# Patient Record
Sex: MALE | Race: BLACK OR AFRICAN AMERICAN | NOT HISPANIC OR LATINO | URBAN - METROPOLITAN AREA
[De-identification: names, ages, dates, MRNs, and addresses within clinical notes are randomized per-mention and may not be internally consistent; named-entity substitution may affect disease eponyms.]

---

## 2021-10-19 ENCOUNTER — EMERGENCY (EMERGENCY)
Facility: HOSPITAL | Age: 66
LOS: 1 days | Discharge: HOME | End: 2021-10-19
Attending: EMERGENCY MEDICINE
Payer: COMMERCIAL

## 2021-10-19 VITALS
HEART RATE: 62 BPM | SYSTOLIC BLOOD PRESSURE: 147 MMHG | TEMPERATURE: 98 F | DIASTOLIC BLOOD PRESSURE: 85 MMHG | OXYGEN SATURATION: 98 % | RESPIRATION RATE: 18 BRPM

## 2021-10-19 VITALS
DIASTOLIC BLOOD PRESSURE: 88 MMHG | SYSTOLIC BLOOD PRESSURE: 152 MMHG | OXYGEN SATURATION: 98 % | WEIGHT: 184.97 LBS | RESPIRATION RATE: 19 BRPM | TEMPERATURE: 98 F | HEART RATE: 65 BPM

## 2021-10-19 LAB
ALBUMIN SERPL ELPH-MCNC: 4.1 G/DL — SIGNIFICANT CHANGE UP (ref 3.3–5.2)
ALP SERPL-CCNC: 78 U/L — SIGNIFICANT CHANGE UP (ref 40–120)
ALT FLD-CCNC: 27 U/L — SIGNIFICANT CHANGE UP
ANION GAP SERPL CALC-SCNC: 10 MMOL/L — SIGNIFICANT CHANGE UP (ref 5–17)
APPEARANCE UR: CLEAR — SIGNIFICANT CHANGE UP
AST SERPL-CCNC: 21 U/L — SIGNIFICANT CHANGE UP
BASOPHILS # BLD AUTO: 0.03 K/UL — SIGNIFICANT CHANGE UP (ref 0–0.2)
BASOPHILS NFR BLD AUTO: 0.5 % — SIGNIFICANT CHANGE UP (ref 0–2)
BILIRUB SERPL-MCNC: 0.2 MG/DL — LOW (ref 0.4–2)
BILIRUB UR-MCNC: NEGATIVE — SIGNIFICANT CHANGE UP
BUN SERPL-MCNC: 22.1 MG/DL — HIGH (ref 8–20)
CALCIUM SERPL-MCNC: 8.9 MG/DL — SIGNIFICANT CHANGE UP (ref 8.6–10.2)
CHLORIDE SERPL-SCNC: 99 MMOL/L — SIGNIFICANT CHANGE UP (ref 98–107)
CO2 SERPL-SCNC: 27 MMOL/L — SIGNIFICANT CHANGE UP (ref 22–29)
COLOR SPEC: YELLOW — SIGNIFICANT CHANGE UP
CREAT SERPL-MCNC: 1.14 MG/DL — SIGNIFICANT CHANGE UP (ref 0.5–1.3)
DIFF PNL FLD: NEGATIVE — SIGNIFICANT CHANGE UP
EOSINOPHIL # BLD AUTO: 0.12 K/UL — SIGNIFICANT CHANGE UP (ref 0–0.5)
EOSINOPHIL NFR BLD AUTO: 2 % — SIGNIFICANT CHANGE UP (ref 0–6)
GLUCOSE BLDC GLUCOMTR-MCNC: 120 MG/DL — HIGH (ref 70–99)
GLUCOSE SERPL-MCNC: 143 MG/DL — HIGH (ref 70–99)
GLUCOSE UR QL: NEGATIVE MG/DL — SIGNIFICANT CHANGE UP
HCT VFR BLD CALC: 42.2 % — SIGNIFICANT CHANGE UP (ref 39–50)
HGB BLD-MCNC: 14.2 G/DL — SIGNIFICANT CHANGE UP (ref 13–17)
IMM GRANULOCYTES NFR BLD AUTO: 0.3 % — SIGNIFICANT CHANGE UP (ref 0–1.5)
KETONES UR-MCNC: NEGATIVE — SIGNIFICANT CHANGE UP
LEUKOCYTE ESTERASE UR-ACNC: NEGATIVE — SIGNIFICANT CHANGE UP
LIDOCAIN IGE QN: 158 U/L — HIGH (ref 22–51)
LYMPHOCYTES # BLD AUTO: 1.4 K/UL — SIGNIFICANT CHANGE UP (ref 1–3.3)
LYMPHOCYTES # BLD AUTO: 23.6 % — SIGNIFICANT CHANGE UP (ref 13–44)
MAGNESIUM SERPL-MCNC: 1.6 MG/DL — SIGNIFICANT CHANGE UP (ref 1.6–2.6)
MCHC RBC-ENTMCNC: 29.6 PG — SIGNIFICANT CHANGE UP (ref 27–34)
MCHC RBC-ENTMCNC: 33.6 GM/DL — SIGNIFICANT CHANGE UP (ref 32–36)
MCV RBC AUTO: 88.1 FL — SIGNIFICANT CHANGE UP (ref 80–100)
MONOCYTES # BLD AUTO: 0.41 K/UL — SIGNIFICANT CHANGE UP (ref 0–0.9)
MONOCYTES NFR BLD AUTO: 6.9 % — SIGNIFICANT CHANGE UP (ref 2–14)
NEUTROPHILS # BLD AUTO: 3.95 K/UL — SIGNIFICANT CHANGE UP (ref 1.8–7.4)
NEUTROPHILS NFR BLD AUTO: 66.7 % — SIGNIFICANT CHANGE UP (ref 43–77)
NITRITE UR-MCNC: NEGATIVE — SIGNIFICANT CHANGE UP
NT-PROBNP SERPL-SCNC: 6 PG/ML — SIGNIFICANT CHANGE UP (ref 0–300)
PH UR: 7 — SIGNIFICANT CHANGE UP (ref 5–8)
PLATELET # BLD AUTO: 187 K/UL — SIGNIFICANT CHANGE UP (ref 150–400)
POTASSIUM SERPL-MCNC: 3.7 MMOL/L — SIGNIFICANT CHANGE UP (ref 3.5–5.3)
POTASSIUM SERPL-SCNC: 3.7 MMOL/L — SIGNIFICANT CHANGE UP (ref 3.5–5.3)
PROT SERPL-MCNC: 7.1 G/DL — SIGNIFICANT CHANGE UP (ref 6.6–8.7)
PROT UR-MCNC: NEGATIVE MG/DL — SIGNIFICANT CHANGE UP
RAPID RVP RESULT: DETECTED
RBC # BLD: 4.79 M/UL — SIGNIFICANT CHANGE UP (ref 4.2–5.8)
RBC # FLD: 12.8 % — SIGNIFICANT CHANGE UP (ref 10.3–14.5)
RV+EV RNA SPEC QL NAA+PROBE: DETECTED
SARS-COV-2 RNA SPEC QL NAA+PROBE: SIGNIFICANT CHANGE UP
SODIUM SERPL-SCNC: 136 MMOL/L — SIGNIFICANT CHANGE UP (ref 135–145)
SP GR SPEC: 1 — LOW (ref 1.01–1.02)
TROPONIN T SERPL-MCNC: <0.01 NG/ML — SIGNIFICANT CHANGE UP (ref 0–0.06)
TSH SERPL-MCNC: 1.42 UIU/ML — SIGNIFICANT CHANGE UP (ref 0.27–4.2)
UROBILINOGEN FLD QL: NEGATIVE MG/DL — SIGNIFICANT CHANGE UP
WBC # BLD: 5.93 K/UL — SIGNIFICANT CHANGE UP (ref 3.8–10.5)
WBC # FLD AUTO: 5.93 K/UL — SIGNIFICANT CHANGE UP (ref 3.8–10.5)

## 2021-10-19 PROCEDURE — 71045 X-RAY EXAM CHEST 1 VIEW: CPT | Mod: 26

## 2021-10-19 PROCEDURE — 93306 TTE W/DOPPLER COMPLETE: CPT | Mod: 26

## 2021-10-19 PROCEDURE — 83880 ASSAY OF NATRIURETIC PEPTIDE: CPT

## 2021-10-19 PROCEDURE — 83690 ASSAY OF LIPASE: CPT

## 2021-10-19 PROCEDURE — 99284 EMERGENCY DEPT VISIT MOD MDM: CPT | Mod: 25

## 2021-10-19 PROCEDURE — 96361 HYDRATE IV INFUSION ADD-ON: CPT

## 2021-10-19 PROCEDURE — 82962 GLUCOSE BLOOD TEST: CPT

## 2021-10-19 PROCEDURE — 80053 COMPREHEN METABOLIC PANEL: CPT

## 2021-10-19 PROCEDURE — 0225U NFCT DS DNA&RNA 21 SARSCOV2: CPT

## 2021-10-19 PROCEDURE — 85025 COMPLETE CBC W/AUTO DIFF WBC: CPT

## 2021-10-19 PROCEDURE — 93010 ELECTROCARDIOGRAM REPORT: CPT | Mod: 76

## 2021-10-19 PROCEDURE — 84484 ASSAY OF TROPONIN QUANT: CPT

## 2021-10-19 PROCEDURE — G0378: CPT

## 2021-10-19 PROCEDURE — 81003 URINALYSIS AUTO W/O SCOPE: CPT

## 2021-10-19 PROCEDURE — 84443 ASSAY THYROID STIM HORMONE: CPT

## 2021-10-19 PROCEDURE — 36415 COLL VENOUS BLD VENIPUNCTURE: CPT

## 2021-10-19 PROCEDURE — 99234 HOSP IP/OBS SM DT SF/LOW 45: CPT

## 2021-10-19 PROCEDURE — 99284 EMERGENCY DEPT VISIT MOD MDM: CPT

## 2021-10-19 PROCEDURE — 87086 URINE CULTURE/COLONY COUNT: CPT

## 2021-10-19 PROCEDURE — 71045 X-RAY EXAM CHEST 1 VIEW: CPT

## 2021-10-19 PROCEDURE — 93005 ELECTROCARDIOGRAM TRACING: CPT

## 2021-10-19 PROCEDURE — 96374 THER/PROPH/DIAG INJ IV PUSH: CPT | Mod: XU

## 2021-10-19 PROCEDURE — 93306 TTE W/DOPPLER COMPLETE: CPT

## 2021-10-19 PROCEDURE — 83735 ASSAY OF MAGNESIUM: CPT

## 2021-10-19 RX ORDER — SODIUM CHLORIDE 9 MG/ML
500 INJECTION INTRAMUSCULAR; INTRAVENOUS; SUBCUTANEOUS ONCE
Refills: 0 | Status: COMPLETED | OUTPATIENT
Start: 2021-10-19 | End: 2021-10-19

## 2021-10-19 RX ORDER — DEXTROSE 50 % IN WATER 50 %
12.5 SYRINGE (ML) INTRAVENOUS ONCE
Refills: 0 | Status: DISCONTINUED | OUTPATIENT
Start: 2021-10-19 | End: 2021-10-23

## 2021-10-19 RX ORDER — SODIUM CHLORIDE 9 MG/ML
1000 INJECTION, SOLUTION INTRAVENOUS
Refills: 0 | Status: DISCONTINUED | OUTPATIENT
Start: 2021-10-19 | End: 2021-10-23

## 2021-10-19 RX ORDER — HYDROCHLOROTHIAZIDE 25 MG
12.5 TABLET ORAL AT BEDTIME
Refills: 0 | Status: DISCONTINUED | OUTPATIENT
Start: 2021-10-19 | End: 2021-10-23

## 2021-10-19 RX ORDER — FLUTICASONE PROPIONATE 50 MCG
1 SPRAY, SUSPENSION NASAL
Refills: 0 | Status: DISCONTINUED | OUTPATIENT
Start: 2021-10-19 | End: 2021-10-23

## 2021-10-19 RX ORDER — DEXTROSE 50 % IN WATER 50 %
15 SYRINGE (ML) INTRAVENOUS ONCE
Refills: 0 | Status: DISCONTINUED | OUTPATIENT
Start: 2021-10-19 | End: 2021-10-23

## 2021-10-19 RX ORDER — INSULIN LISPRO 100/ML
VIAL (ML) SUBCUTANEOUS
Refills: 0 | Status: DISCONTINUED | OUTPATIENT
Start: 2021-10-19 | End: 2021-10-23

## 2021-10-19 RX ORDER — DEXTROSE 50 % IN WATER 50 %
25 SYRINGE (ML) INTRAVENOUS ONCE
Refills: 0 | Status: DISCONTINUED | OUTPATIENT
Start: 2021-10-19 | End: 2021-10-23

## 2021-10-19 RX ORDER — ONDANSETRON 8 MG/1
4 TABLET, FILM COATED ORAL ONCE
Refills: 0 | Status: COMPLETED | OUTPATIENT
Start: 2021-10-19 | End: 2021-10-19

## 2021-10-19 RX ORDER — GLUCAGON INJECTION, SOLUTION 0.5 MG/.1ML
1 INJECTION, SOLUTION SUBCUTANEOUS ONCE
Refills: 0 | Status: DISCONTINUED | OUTPATIENT
Start: 2021-10-19 | End: 2021-10-23

## 2021-10-19 RX ORDER — LOSARTAN POTASSIUM 100 MG/1
100 TABLET, FILM COATED ORAL DAILY
Refills: 0 | Status: DISCONTINUED | OUTPATIENT
Start: 2021-10-19 | End: 2021-10-23

## 2021-10-19 RX ADMIN — SODIUM CHLORIDE 500 MILLILITER(S): 9 INJECTION INTRAMUSCULAR; INTRAVENOUS; SUBCUTANEOUS at 04:58

## 2021-10-19 RX ADMIN — LOSARTAN POTASSIUM 100 MILLIGRAM(S): 100 TABLET, FILM COATED ORAL at 06:10

## 2021-10-19 RX ADMIN — Medication 12.5 MILLIGRAM(S): at 06:10

## 2021-10-19 RX ADMIN — ONDANSETRON 4 MILLIGRAM(S): 8 TABLET, FILM COATED ORAL at 04:59

## 2021-10-19 RX ADMIN — SODIUM CHLORIDE 500 MILLILITER(S): 9 INJECTION INTRAMUSCULAR; INTRAVENOUS; SUBCUTANEOUS at 05:56

## 2021-10-19 NOTE — ED CDU PROVIDER INITIAL DAY NOTE - ATTENDING CONTRIBUTION TO CARE
65 yo well appearing male with brief episode of syncope when he woke up this morning. I personally saw the patient with the PA, and completed the key components of the history and physical exam. I then discussed the management plan with the PA.

## 2021-10-19 NOTE — ED CDU PROVIDER INITIAL DAY NOTE - OBJECTIVE STATEMENT
pt is a 65 y/o male with a pmhx of HTN, DM presenting to the ed for evaluation. pt states he got up to go to the bathroom tonight, started to feel dizzy, became diaphoretic so walked back to bed and passed out on the bed. pt states he then started vomiting multiple episodes non bloody non bilious. pt denies cardiac hx, follows up with cardiologist in [facility] last saw 2 years ago, believes stress/echo 2 years ago last, no abnormalities pt non smoker. pt denies headache neck pain head injury visual changes cp sob abd pain rectal bleeding melena diarrhea fever cough calf pain leg swelling hx of dvt or pe recent surgeries or travel

## 2021-10-19 NOTE — ED ADULT NURSE NOTE - OBJECTIVE STATEMENT
pt arrives w/ c/o dizziness and nausea. pt states he got up to use the bathroom and became so dizzy and nauseous. pt denies any chest pain, fever, cough, vomiting or falling. IV accessed 20 gauge LAC labs drawn and sent. pt placed on Cm normal sinus rhythm noted resp even and unlabored. waiting further orders will continue to monitor. pt arrives w/ c/o dizziness and nausea. pt states he got up to use the bathroom and became so dizzy and nauseous .pt states he went to bed and passed out and had two episodes of vomiting.  pt denies any chest pain, fever, cough, or falling. IV accessed 20 gauge LAC labs drawn and sent. pt placed on Cm normal sinus rhythm noted resp even and unlabored. waiting further orders will continue to monitor.

## 2021-10-19 NOTE — ED CDU PROVIDER DISPOSITION NOTE - CLINICAL COURSE
66 year old male with PMHx HTN, DM presents to the ED for episode of syncope after walking away from bathroom. No h/o cardiac dz. Pt placed in obs and seen by Cardiology. Pt with 3 negative trops, urine neg, echo with nl EF, no WMA. RVP + for enterovirus. Pt feeling well at this time. Will dc with return precautions copy of results and advise pt to follow up with his established cardiologist.

## 2021-10-19 NOTE — CONSULT NOTE ADULT - SUBJECTIVE AND OBJECTIVE BOX
[place] [facility]                                                               Office:  [address] [address]                                                              Telephone: [phone]. Fax:[phone]                                                                        CARDIOLOGY CONSULTATION NOTE                                                                                             Reason for Consultation: Dizzy, syncope  History obtained by: Patient and medical record   obtained: No  Cardiologist: diamante    Chief complaint:    Patient is a 66y old  Male who presents with a chief complaint of dizzy      HPI: 66 M with PMH HTN, DM, presenting to ED with c/o dizzy. Pt states got up in night to go to the bathroom, felt dizzy, did not go to bathroom, went back to bed, passed out. Wife stated didn't not answer right away, diaphoretic, Nausea-vomiting x 3. Follows with [facility]. In ED RVP + enterovirus. c/o cough, denies fevers, runny nose.         REVIEW OF SYMPTOMS:     CONSTITUTIONAL: No fever, no weight loss, no fatigue, no weight gain   ENMT:  No difficulty hearing, tinnitus, vertigo; No sinus or throat pain  NECK: No pain or stiffness  CARDIOVASCULAR: No chest pain, no dyspnea, no syncope, no palpitations, no dizziness, no Orthopnea, no Paroxsymal nocturnal dyspnea  RESPIRATORY: No Dyspnea on exertion, no Shortness of breath, no cough, no wheezing  : No dysuria, no hematuria   GI: No dark color stool, no melena, no diarrhea, no constipation, no abdominal pain   NEURO: No headache, no dizziness, no slurred speech   MUSCULOSKELETAL: No joint pain or swelling; No muscle, back, or extremity pain  PSYCH: No agitation, no anxiety.    ALL OTHER REVIEW OF SYSTEMS ARE NEGATIVE.      ALLERGIES: Allergies  No Known Allergies  Intolerances        PAST MEDICAL HISTORY  HTN  DM    PAST SURGICAL HISTORY      FAMILY HISTORY:      SOCIAL HISTORY:  Denies smoking/alcohol/drugs      CURRENT MEDICATIONS:  hydrochlorothiazide 12.5 milliGRAM(s) Oral at bedtime  losartan 100 milliGRAM(s) Oral daily    dextrose 40% Gel  dextrose 5%.  dextrose 50% Injectable  fluticasone propionate 50 MICROgram(s)/spray Nasal Spray  glucagon  Injectable  insulin lispro (ADMELOG) corrective regimen sliding scale      HOME MEDICATIONS:      Vital Signs Last 24 Hrs  T(C): 36.8 (19 Oct 2021 05:03), Max: 36.8 (19 Oct 2021 05:03)  T(F): 98.2 (19 Oct 2021 05:03), Max: 98.2 (19 Oct 2021 05:03)  HR: 62 (19 Oct 2021 05:03) (62 - 65)  BP: 147/85 (19 Oct 2021 05:03) (147/85 - 152/88)  RR: 18 (19 Oct 2021 05:03) (18 - 19)  SpO2: 98% (19 Oct 2021 05:03) (98% - 98%)        PHYSICAL EXAM:  Constitutional: Comfortable . No acute distress.   HEENT: Atraumatic and normocephalic, neck is supple. + JVD.   CNS: A&Ox3. No focal deficits. EOMI.   Respiratory: CTAB, unlabored   Cardiovascular: S1S2 RRR. No murmur/rubs or gallop.  Gastrointestinal: Soft non-tender and non distended . +Bowel sounds.   Extremities: trace edema.   Psychiatric: Calm. no agitation.  Skin: No skin rash/ulcers visualized to face, hands or feet.        LABS:                        14.2   5.93  )-----------( 187      ( 19 Oct 2021 05:09 )             42.2     10-19    136  |  99  |  22.1<H>  ----------------------------<  143<H>  3.7   |  27.0  |  1.14    Ca    8.9      19 Oct 2021 05:09  Mg     1.6     10-19    TPro  7.1  /  Alb  4.1  /  TBili  0.2<L>  /  DBili  x   /  AST  21  /  ALT  27  /  AlkPhos  78  10-19    CARDIAC MARKERS ( 19 Oct 2021 08:35 )  x     / <0.01 ng/mL / x     / x     / x      CARDIAC MARKERS ( 19 Oct 2021 05:09 )  x     / <0.01 ng/mL / x     / x     / x        ;p-BNP=Serum Pro-Brain Natriuretic Peptide: 6 pg/mL (10-19 @ 05:09)    Urinalysis Basic - ( 19 Oct 2021 08:52 )    Color: Yellow / Appearance: Clear / S.005 / pH: x  Gluc: x / Ketone: Negative  / Bili: Negative / Urobili: Negative mg/dL   Blood: x / Protein: Negative mg/dL / Nitrite: Negative   Leuk Esterase: Negative / RBC: x / WBC x   Sq Epi: x / Non Sq Epi: x / Bacteria: x      INTERPRETATION OF TELEMETRY: Reviewed by me.   ECG: SR NSST T abnl    RADIOLOGY & ADDITIONAL STUDIES:    X-ray:  reviewed by me.

## 2021-10-19 NOTE — CONSULT NOTE ADULT - ASSESSMENT
66 M with PMH HTN, DM, presenting to ED with c/o dizzy. Pt states got up in night to go to the bathroom, felt dizzy, did not go to bathroom, went back to bed, passed out. Wife stated didn't not answer right away, diaphoretic, Nausea-vomiting x 3. Follows with [facility]. In ED RVP + enterovirus.    Syncope   - Orthostatics neg  - +enterovirus   - EKG SR, NSST abnl  - telemetry SB 56  - TTE pending  - follows with [facility]  - recommend [facility] as out patient, outpatient ischemic work up  - if TTE normal EF, normal valvular function, no RWMA can follow up with pvt cardiologist

## 2021-10-19 NOTE — ED ADULT NURSE NOTE - NS TRANSFER PATIENT BELONGINGS
What can I do to prevent getting a rash under the breast?  There are some simple things you can do to reduce your risk of getting intertrigo and stop any irritation from getting worse.  1. Wash under your breasts morning and night with a gentle soap or soap substitute (for example emulsifying ointment). You can ask your pharmacist about this.  2. Dry the skin under your breasts thoroughly after washing - gently pat dry with a clean, soft towel or you can try using a hairdryer on a cool setting. This can be very effective, especially if you have large breasts.  3. Wear a well-fitting, supportive bra made from a natural material such as cotton. Manmade materials such as nylon can trap moisture. It can help to wear a cotton top under your bra. If the skin is weeping then try to change your bra daily or use cotton dressings. You can ask your practice nurse for help with this.  4. Losing weight may help to limit the areas where skin can rub against skin.  - Use DESITIN diaper cream to dry out area if possible   Clothing

## 2021-10-19 NOTE — ED PROVIDER NOTE - ATTENDING CONTRIBUTION TO CARE
67 yo well appearing male with brief episode of syncope when he woke up this morning. I personally saw the patient with the PA, and completed the key components of the history and physical exam. I then discussed the management plan with the PA.

## 2021-10-19 NOTE — ED CDU PROVIDER DISPOSITION NOTE - PATIENT PORTAL LINK FT
You can access the FollowMyHealth Patient Portal offered by [facility] by registering at the following website: [email or web address]. By joining [employer]’s FollowMyHealth portal, you will also be able to view your health information using other applications (apps) compatible with our system.

## 2021-10-19 NOTE — ED ADULT NURSE NOTE - CHIEF COMPLAINT QUOTE
Patient with complains of syncopal episode with diaphoresis, nausea vomit and sbp in 70's after going to bathroom. Patient denies any neuro and motor defect. Patient alert and oriented times four with gcs 15. sbp in 150's in triage. . Patient ambulatory upon arrival.

## 2021-10-19 NOTE — ED PROVIDER NOTE - OBJECTIVE STATEMENT
pt is a 65 y/o male with a pmhx of HTN, DM presenting to the ed for evaluation. pt states he got up to go to the bathroom tonight, started to feel dizzy, became diaphoretic so walked back to bed and passed out on the bed. pt states he then started vomiting multiple episodes non bloody non bilious. pt denies cardiac hx, follows up with cardiologist in [facility] last saw 2 years ago, believes stress/echo 2 years ago last, no abnormalities pt non smoker. pt denies headache neck pain head injury visual changes cp sob abd pain rectal bleeding melena diarrhea fever cough calf pain leg swelling hx of dvt or pe recent surgeries or travel

## 2021-10-19 NOTE — ED ADULT TRIAGE NOTE - CHIEF COMPLAINT QUOTE
Patient with complains of syncopal episode with diaphoresis, nausea vomit and sbp in 70's after going to bathroom. Patient denies any neuro and motor defect. Patient alert and oriented times four with gcs 15. Patient with complains of syncopal episode with diaphoresis, nausea vomit and sbp in 70's after going to bathroom. Patient denies any neuro and motor defect. Patient alert and oriented times four with gcs 15. sbp in 150's in triage. Patient with complains of syncopal episode with diaphoresis, nausea vomit and sbp in 70's after going to bathroom. Patient denies any neuro and motor defect. Patient alert and oriented times four with gcs 15. sbp in 150's in triage. . Patient ambulatory in walk in triage, Patient with complains of syncopal episode with diaphoresis, nausea vomit and sbp in 70's after going to bathroom. Patient denies any neuro and motor defect. Patient alert and oriented times four with gcs 15. sbp in 150's in triage. . Patient ambulatory upon arrival.

## 2021-10-19 NOTE — CONSULT NOTE ADULT - ATTENDING COMMENTS
Patient seen and examined by me.  Patient is a 66 M with PMH HTN, DM, presenting to ED with c/o dizzy. Pt states got up in night to go to the bathroom, felt dizzy, did not go to bathroom, went back to bed, passed out. Wife stated didn't not answer right away, diaphoretic, Nausea-vomiting x 3. Follows with [facility]. In ED RVP + enterovirus.    < from: TTE Echo Complete w/o Contrast w/ Doppler (10.19.21 @ 11:21) >      Summary:   1. Left ventricular ejection fraction, by visual estimation, is 60 to 65%.   2. Normal global left ventricular systolic function.   3. Spectral Doppler shows impaired relaxation pattern of left ventricular myocardial filling (Grade I diastolic dysfunction).   4. Normal left atrial size.   5. Normal right atrial size.   6. Mild mitral valve regurgitation.   7. Mild thickening of the anterior mitral valve leaflet.   8. Sclerotic aortic valve with normal opening.   9. There is no evidence of pericardial effusion.  10. There are no prior studies on this patient for comparison purposes.    Rosmery Nina D.O. Electronically signed on 10/19/2021 at 12:29:34 PM    < end of copied text >      Exam- Unremarkable.  Orthostatics negative    Patients EKG- NSR. Normal  ECHO is unremarkable.  Troponins are negative.  Patient and his wife reported that he has an appointment to f/u with his Cardiologist at [facility] on Friday.    Patient may be discharged home     Will sign off.

## 2021-10-19 NOTE — ED PROVIDER NOTE - ENMT NEGATIVE STATEMENT, MLM
Ears: no ear pain and no hearing problems. Nose: no nasal congestion and no nasal drainage. Mouth/Throat: no dysphagia, no hoarseness and no throat pain. Neck: no lumps, no pain, no stiffness and no swollen glands. barbara sister

## 2021-10-19 NOTE — ED CDU PROVIDER DISPOSITION NOTE - NSFOLLOWUPINSTRUCTIONS_ED_ALL_ED_FT
Follow up with your Cardiologist within 1-2 weeks  Follow up with primary medical doctor in 2-3 days   Drink lots of fluids   Copy of results provided to you       Syncope    WHAT YOU NEED TO KNOW:    Syncope is also called fainting or passing out. Syncope is a sudden, temporary loss of consciousness, followed by a fall from a standing or sitting position. Syncope ranges from not serious to a sign of a more serious condition that needs to be treated. You can control some health conditions that cause syncope. Your healthcare providers can help you create a plan to manage syncope and prevent episodes.    DISCHARGE INSTRUCTIONS:    Seek care immediately if:   •You are bleeding because you hit your head when you fainted.       •You suddenly have double vision, difficulty speaking, numbness, and cannot move your arms or legs.      •You have chest pain and trouble breathing.      •You vomit blood or material that looks like coffee grounds.      •You see blood in your bowel movement.      Contact your healthcare provider if:   •You have new or worsening symptoms.      •You have another syncope episode.      •You have a headache, fast heartbeat, or feel too dizzy to stand up.      •You have questions or concerns about your condition or care.      Medicines:   •Medicines may be needed to help your heart pump strongly and regularly. Your healthcare provider may also make changes to any medicines that are causing syncope.       •Take your medicine as directed. Contact your healthcare provider if you think your medicine is not helping or if you have side effects. Tell him or her if you are allergic to any medicine. Keep a list of the medicines, vitamins, and herbs you take. Include the amounts, and when and why you take them. Bring the list or the pill bottles to follow-up visits. Carry your medicine list with you in case of an emergency.      Follow up with your doctor as directed: Write down your questions so you remember to ask them during your visits.     Manage syncope:   •Keep a record of your syncope episodes. Include your symptoms and your activity before and after the episode. The record can help your healthcare provider find the cause of your syncope and help you manage episodes.      •Sit or lie down when needed. This includes when you feel dizzy, your throat is getting tight, and your vision changes. Raise your legs above the level of your heart.      •Take slow, deep breaths if you start to breathe faster with anxiety or fear. This can help decrease dizziness and the feeling that you might faint.       •Check your blood pressure often. This is important if you take medicine to lower your blood pressure. Check your blood pressure when you are lying down and when you are standing. Ask how often to check during the day. Keep a record of your blood pressure numbers. Your healthcare provider may use the record to help plan your treatment.  How to take a Blood Pressure           Prevent a syncope episode:   •Move slowly and let yourself get used to one position before you move to another position. This is very important when you change from a lying or sitting position to a standing position. Take some deep breaths before you stand up from a lying position. Stand up slowly. Sudden movements may cause a fainting spell. Sit on the side of the bed or couch for a few minutes before you stand up. If you are on bedrest, try to be upright for about 2 hours each day, or as directed. Do not lock your legs if you are standing for a long period of time. Move your legs and bend your knees to keep blood flowing.      •Follow your healthcare provider's recommendations. Your provider may recommend that you drink more liquids to prevent dehydration. You may also need to have more salt to keep your blood pressure from dropping too low and causing syncope. Your provider will tell you how much liquid and sodium to have each day. He or she will also tell you how much physical activity is safe for you. This will depend on what is causing your syncope.      •Watch for signs of low blood sugar. These include hunger, nervousness, sweating, and fast or fluttery heartbeats. Talk with your healthcare provider about ways to keep your blood sugar level steady.      •Do not strain if you are constipated. You may faint if you strain to have a bowel movement. Walking is the best way to get your bowels moving. Eat foods high in fiber to make it easier to have a bowel movement. Good examples are high-fiber cereals, beans, vegetables, and whole-grain breads. Prune juice may help make bowel movements softer.      •Be careful in hot weather. Heat can cause a syncope episode. Limit activity done outside on hot days. Physical activity in hot weather can lead to dehydration. This can cause an episode.

## 2021-10-20 LAB
CULTURE RESULTS: SIGNIFICANT CHANGE UP
SPECIMEN SOURCE: SIGNIFICANT CHANGE UP

## 2024-01-03 NOTE — ED CDU PROVIDER DISPOSITION NOTE - NSFOLLOWUPCLINICS_GEN_ALL_ED_FT
[facility]  Cardiology  [address] [place] [address]  Phone: [phone]  Fax:     
no

## 2024-05-20 ENCOUNTER — EMERGENCY (EMERGENCY)
Facility: HOSPITAL | Age: 69
LOS: 1 days | Discharge: HOME | End: 2024-05-20
Attending: STUDENT IN AN ORGANIZED HEALTH CARE EDUCATION/TRAINING PROGRAM
Payer: MEDICARE

## 2024-05-20 VITALS
OXYGEN SATURATION: 95 % | SYSTOLIC BLOOD PRESSURE: 123 MMHG | DIASTOLIC BLOOD PRESSURE: 78 MMHG | TEMPERATURE: 97 F | HEART RATE: 62 BPM | RESPIRATION RATE: 16 BRPM

## 2024-05-20 LAB
ALBUMIN SERPL ELPH-MCNC: 4 G/DL — SIGNIFICANT CHANGE UP (ref 3.3–5.2)
ALP SERPL-CCNC: 107 U/L — SIGNIFICANT CHANGE UP (ref 40–120)
ALT FLD-CCNC: 73 U/L — HIGH
ANION GAP SERPL CALC-SCNC: 14 MMOL/L — SIGNIFICANT CHANGE UP (ref 5–17)
AST SERPL-CCNC: 61 U/L — HIGH
BASOPHILS # BLD AUTO: 0.01 K/UL — SIGNIFICANT CHANGE UP (ref 0–0.2)
BASOPHILS NFR BLD AUTO: 0.2 % — SIGNIFICANT CHANGE UP (ref 0–2)
BILIRUB SERPL-MCNC: 0.2 MG/DL — LOW (ref 0.4–2)
BUN SERPL-MCNC: 17.3 MG/DL — SIGNIFICANT CHANGE UP (ref 8–20)
CALCIUM SERPL-MCNC: 9.6 MG/DL — SIGNIFICANT CHANGE UP (ref 8.4–10.5)
CHLORIDE SERPL-SCNC: 97 MMOL/L — SIGNIFICANT CHANGE UP (ref 96–108)
CO2 SERPL-SCNC: 25 MMOL/L — SIGNIFICANT CHANGE UP (ref 22–29)
CREAT SERPL-MCNC: 0.94 MG/DL — SIGNIFICANT CHANGE UP (ref 0.5–1.3)
D DIMER BLD IA.RAPID-MCNC: 217 NG/ML DDU — SIGNIFICANT CHANGE UP
EGFR: 89 ML/MIN/1.73M2 — SIGNIFICANT CHANGE UP
EOSINOPHIL # BLD AUTO: 0.05 K/UL — SIGNIFICANT CHANGE UP (ref 0–0.5)
EOSINOPHIL NFR BLD AUTO: 0.9 % — SIGNIFICANT CHANGE UP (ref 0–6)
GLUCOSE SERPL-MCNC: 214 MG/DL — HIGH (ref 70–99)
HCT VFR BLD CALC: 38.1 % — LOW (ref 39–50)
HGB BLD-MCNC: 13 G/DL — SIGNIFICANT CHANGE UP (ref 13–17)
IMM GRANULOCYTES NFR BLD AUTO: 0.4 % — SIGNIFICANT CHANGE UP (ref 0–0.9)
LYMPHOCYTES # BLD AUTO: 0.53 K/UL — LOW (ref 1–3.3)
LYMPHOCYTES # BLD AUTO: 9.8 % — LOW (ref 13–44)
MAGNESIUM SERPL-MCNC: 1.5 MG/DL — LOW (ref 1.6–2.6)
MCHC RBC-ENTMCNC: 29 PG — SIGNIFICANT CHANGE UP (ref 27–34)
MCHC RBC-ENTMCNC: 34.1 GM/DL — SIGNIFICANT CHANGE UP (ref 32–36)
MCV RBC AUTO: 84.9 FL — SIGNIFICANT CHANGE UP (ref 80–100)
MONOCYTES # BLD AUTO: 0.31 K/UL — SIGNIFICANT CHANGE UP (ref 0–0.9)
MONOCYTES NFR BLD AUTO: 5.7 % — SIGNIFICANT CHANGE UP (ref 2–14)
NEUTROPHILS # BLD AUTO: 4.49 K/UL — SIGNIFICANT CHANGE UP (ref 1.8–7.4)
NEUTROPHILS NFR BLD AUTO: 83 % — HIGH (ref 43–77)
PHOSPHATE SERPL-MCNC: 4 MG/DL — SIGNIFICANT CHANGE UP (ref 2.4–4.7)
PLATELET # BLD AUTO: 154 K/UL — SIGNIFICANT CHANGE UP (ref 150–400)
POTASSIUM SERPL-MCNC: 5.3 MMOL/L — SIGNIFICANT CHANGE UP (ref 3.5–5.3)
POTASSIUM SERPL-SCNC: 5.3 MMOL/L — SIGNIFICANT CHANGE UP (ref 3.5–5.3)
PROT SERPL-MCNC: 7.3 G/DL — SIGNIFICANT CHANGE UP (ref 6.6–8.7)
RBC # BLD: 4.49 M/UL — SIGNIFICANT CHANGE UP (ref 4.2–5.8)
RBC # FLD: 12.4 % — SIGNIFICANT CHANGE UP (ref 10.3–14.5)
SODIUM SERPL-SCNC: 136 MMOL/L — SIGNIFICANT CHANGE UP (ref 135–145)
TROPONIN T, HIGH SENSITIVITY RESULT: 37 NG/L — SIGNIFICANT CHANGE UP (ref 0–51)
TROPONIN T, HIGH SENSITIVITY RESULT: 41 NG/L — SIGNIFICANT CHANGE UP (ref 0–51)
TSH SERPL-MCNC: 1.16 UIU/ML — SIGNIFICANT CHANGE UP (ref 0.27–4.2)
WBC # BLD: 5.41 K/UL — SIGNIFICANT CHANGE UP (ref 3.8–10.5)
WBC # FLD AUTO: 5.41 K/UL — SIGNIFICANT CHANGE UP (ref 3.8–10.5)

## 2024-05-20 PROCEDURE — 71045 X-RAY EXAM CHEST 1 VIEW: CPT | Mod: 26

## 2024-05-20 PROCEDURE — 93010 ELECTROCARDIOGRAM REPORT: CPT

## 2024-05-20 PROCEDURE — 99285 EMERGENCY DEPT VISIT HI MDM: CPT

## 2024-05-20 RX ORDER — MAGNESIUM SULFATE 500 MG/ML
2 VIAL (ML) INJECTION ONCE
Refills: 0 | Status: COMPLETED | OUTPATIENT
Start: 2024-05-20 | End: 2024-05-20

## 2024-05-20 RX ORDER — MAGNESIUM OXIDE 400 MG ORAL TABLET 241.3 MG
400 TABLET ORAL ONCE
Refills: 0 | Status: COMPLETED | OUTPATIENT
Start: 2024-05-20 | End: 2024-05-20

## 2024-05-20 RX ORDER — METHYLPREDNISOLONE 4 MG
500 TABLET ORAL ONCE
Refills: 0 | Status: DISCONTINUED | OUTPATIENT
Start: 2024-05-20 | End: 2024-05-20

## 2024-05-20 RX ADMIN — MAGNESIUM OXIDE 400 MG ORAL TABLET 400 MILLIGRAM(S): 241.3 TABLET ORAL at 22:27

## 2024-05-20 RX ADMIN — Medication 25 GRAM(S): at 22:27

## 2024-05-20 NOTE — ED ADULT NURSE NOTE - OBJECTIVE STATEMENT
Patient presents with c/p syncopal episode at home with head strike. PMHx prostate ca on radiation.  Pt A&Ox4, denies sob/chest pain, resting on stretcher in no apparent distress at this time.  Respirations even and unlabored, negative JVD/diaphoresis.

## 2024-05-20 NOTE — ED ADULT TRIAGE NOTE - GLASGOW COMA SCALE: BEST VERBAL RESPONSE, MLM
Srinivas Mackenzie was in the clinic today to see Tam Donahue MD for   Chief Complaint   Patient presents with   • Blood Pressure   .    Please note, his blood pressures were elevated x2 while in the clinic.    BP Readings from Last 1 Encounters:   01/11/21 1246 (!) 142/82   01/11/21 1231 (!) 140/92       Please review with PCP and follow up with patient as appropriate.     (V5) oriented

## 2024-05-20 NOTE — ED ADULT TRIAGE NOTE - CHIEF COMPLAINT QUOTE
c/o syncopal episode at home.  -head strike, witnessed by wife. Pt with hx of prostate CA, had chemo today. denies CP.

## 2024-05-20 NOTE — ED PROVIDER NOTE - NSTIMEPROVIDERCAREINITIATE_GEN_ER
Bill 45  Progress Note Henry Desai 1957, 60 y o  female MRN: [id number]  Unit/Bed#: [address] [place] [address] Encounter: [id number]  Primary Care Provider: Hipolito Blanton MD   Date and time admitted to hospital: 8/14/2021  1:01 AM    * Acute respiratory failure with hypoxia due to COVID 19 pneumonia  Assessment & Plan  Initially hypoxic, tachypneic  COVID positive  Symptom onset 8/8  Significant other was COVID (+) 2 weeks prior  On imaging, noted to have findings consistent with COVID pneumonia  CRP initially elevated but has normalized  Procalcitonin negative  D-dimer less than 0 27  Continue statin  Completed course of Decadron 0 1 mg/kg IV Q 12 hrs today  Not a candidate for convalescent plasma  Completed remdesivir  Continue therapeutic Lovenox  Currently on 7 liters mid flow  20 milligrams of IV Lasix x1 given 8/27    Atrial fibrillation [facility])  Assessment & Plan  New onset AFib  Continue Lopressor  On Therapeutic Lovenox  Echo results pending    Type 2 diabetes mellitus without complication, without long-term current use of insulin [facility])  Assessment & Plan  Lab Results   Component Value Date    HGBA1C 8 0 (H) 08/28/2021       Recent Labs     08/27/21  1658 08/27/21  2108 08/28/21  0732 08/28/21  1053   POCGLU 320* 281* 236* 315*     Patient had persistent hyperglycemia while on steroids and was started on insulin drip which was discontinued yesterday sugars were stable  Continue Patient on Accu-Cheks with sliding scale insulin    BMI 40 0-44 9, adult (HCC)  Assessment & Plan  Body mass index is 43 35 kg/m²  Pain in both feet  Assessment & Plan  Continue tramadol p r n    Gastroesophageal reflux disease without esophagitis  Assessment & Plan  On Protonix here  Mixed hyperlipidemia  Assessment & Plan  Continue statin  Essential hypertension  Assessment & Plan  Continue Lopressor        VTE Pharmacologic Prophylaxis: VTE Score: 4 Moderate Risk (Score 3-4) - Pharmacological DVT Prophylaxis Ordered: enoxaparin (Lovenox)  Patient Centered Rounds: I performed bedside rounds with nursing staff today  Discussions with Specialists or Other Care Team Provider: yes    Education and Discussions with Family / Patient: Patient declined call to   Time Spent for Care: 30 minutes  More than 50% of total time spent on counseling and coordination of care as described above  Current Length of Stay: 14 day(s)  Current Patient Status: Inpatient   Certification Statement: The patient will continue to require additional inpatient hospital stay due to Acute respiratory failure with hypoxia  Discharge Plan: home    Code Status: Level 1 - Full Code    Subjective:   Continues to report improvement in breathing compared to before  Still with exertional shortness of breath    Objective:     Vitals:   Temp (24hrs), Av 7 °F (36 5 °C), Min:96 4 °F (35 8 °C), Max:98 8 °F (37 1 °C)    Temp:  [96 4 °F (35 8 °C)-98 8 °F (37 1 °C)] 97 9 °F (36 6 °C)  HR:  [68-85] 85  Resp:  [18-22] 22  BP: (107-127)/(64-70) 111/68  SpO2:  [90 %-96 %] 94 %  Body mass index is 43 35 kg/m²  Input and Output Summary (last 24 hours): Intake/Output Summary (Last 24 hours) at 2021 1637  Last data filed at 2021 0630  Gross per 24 hour   Intake 630 ml   Output 1100 ml   Net -470 ml       Physical Exam:   Physical Exam  Vitals reviewed  Constitutional:       General: She is not in acute distress  Appearance: She is well-developed  HENT:      Head: Normocephalic and atraumatic  Eyes:      Conjunctiva/sclera: Conjunctivae normal    Cardiovascular:      Rate and Rhythm: Normal rate and regular rhythm  Pulmonary:      Effort: Pulmonary effort is normal  No respiratory distress  Breath sounds: Normal breath sounds  No wheezing or rales  Abdominal:      General: There is no distension  Palpations: Abdomen is soft  Tenderness: There is no abdominal tenderness  Neurological:      Mental Status: She is alert and oriented to person, place, and time            Additional Data:     Labs:  Results from last 7 days   Lab Units 08/26/21  0623 08/24/21  0616   WBC Thousand/uL 12 32* 11 64*   HEMOGLOBIN g/dL 13 7 11 6   HEMATOCRIT % 40 7 34 6*   PLATELETS Thousands/uL 299 341   BANDS PCT %  --  1   NEUTROS PCT % 87*  --    LYMPHS PCT % 6*  --    LYMPHO PCT %  --  9*   MONOS PCT % 5  --    MONO PCT %  --  2*   EOS PCT % 0 0     Results from last 7 days   Lab Units 08/28/21  0652   SODIUM mmol/L 135*   POTASSIUM mmol/L 4 5   CHLORIDE mmol/L 103   CO2 mmol/L 25   BUN mg/dL 36*   CREATININE mg/dL 1 15   ANION GAP mmol/L 7   CALCIUM mg/dL 7 6*   GLUCOSE RANDOM mg/dL 241*         Results from last 7 days   Lab Units 08/28/21  1053 08/28/21  0732 08/27/21  2108 08/27/21  1658 08/27/21  1125 08/27/21  0728 08/27/21  0557 08/27/21  0345 08/27/21  0150 08/27/21  0005 08/26/21  2216 08/26/21  2020   POC GLUCOSE mg/dl 315* 236* 281* 320* 223* 97 116 143* 108 110 112 152*     Results from last 7 days   Lab Units 08/28/21  0652   HEMOGLOBIN A1C % 8 0*           Lines/Drains:  Invasive Devices     Peripheral Intravenous Line            Long-Dwell Peripheral IV (Midline) 67/61/86 Right Basilic 6 days    Peripheral IV 08/23/21 Left Antecubital 5 days          Drain            External Urinary Catheter 1 day                  Telemetry:  Telemetry Orders (From admission, onward)             48 Hour Telemetry Monitoring  Continuous x 48 hours     Question:  Reason for 48 Hour Telemetry  Answer:  Arrhythmias Requiring Medical Therapy (eg  SVT, Vtach/fib, Bradycardia, Uncontrolled A-fib)                          Imaging: Reviewed radiology reports from this admission including: chest xray    Recent Cultures (last 7 days):         Last 24 Hours Medication List:   Current Facility-Administered Medications   Medication Dose Route Frequency Provider Last Rate    atorvastatin  40 mg Oral MILLI Scott MD Antoinette      benzonatate  100 mg Oral TID PRN Pita Matute MD      enoxaparin  1 mg/kg Subcutaneous Q12H [facility] Pita Matute MD      insulin lispro  1-5 Units Subcutaneous HS Nahed Rea DO      insulin lispro  1-6 Units Subcutaneous TID AC Nahed Rea DO      lidocaine  1 patch Topical Daily Pita Matute MD      magnesium oxide  800 mg Oral BID Pita Matute MD      metoprolol tartrate  50 mg Oral Q12H Leida Kwong MD      pantoprazole  20 mg Oral Early Morning Pita Matute MD      Pramox-PE-Glycerin-Petrolatum   Rectal BID PRN Pita Matute MD      traMADol  50 mg Oral Q8H PRN Pita Matute MD          Today, Patient Was Seen By: Kami Farooq DO    **Please Note: This note may have been constructed using a voice recognition system  ** 20-May-2024 19:31

## 2024-05-20 NOTE — ED PROVIDER NOTE - ATTENDING CONTRIBUTION TO CARE
69M with PMHx of HTN, T2DM, ?thrombosis of vein in abdomen, on eliquis, prostate cancer on radiation (day 10 of 45 today) presents s/p witnessed syncopal episode around 1800 while sitting at the dinner table that lasted about 2 minutes according to wife. Patient was diaphoretic and then lost consciousness. Wife reports she stabilized him during episode, denies head strike. Patient regained consciousness after a couple minutes, denies confused state after. Denies CP, palpitations, N/V, dizziness, SOB, LE edema, vision changes. Reports he follows with cardiology in [place], recent stress test and echo few months ago wnl. Denies symptoms currently.    I, Rossy Neal, personally saw the patient with the resident, and completed the key components of the history and physical exam. I then discussed the management plan with the resident.

## 2024-05-20 NOTE — ED PROVIDER NOTE - OBJECTIVE STATEMENT
69M with PMHx of HTN, T2DM, ?thrombosis of vein in abdomen, on eliquis, prostate cancer on chemo/radiation Day 10 of 45 today presents s/p witnessed syncopal episode around 1800 while sitting at the dinner table that lasted about 2 minutes according to wife. Patient was diaphoretic and then lost consciousness. Wife reports she stabilized him during episode, denies head strike. Patient regained consciousness after a couple minutes, denies confused state after. Denies CP, palpitations, N/V, dizziness, SOB, LE edema, vision changes. 69M with PMHx of HTN, T2DM, ?thrombosis of vein in abdomen, on eliquis, prostate cancer on radiation (day 10 of 45 today) presents s/p witnessed syncopal episode around 1800 while sitting at the dinner table that lasted about 2 minutes according to wife. Patient was diaphoretic and then lost consciousness. Wife reports she stabilized him during episode, denies head strike. Patient regained consciousness after a couple minutes, denies confused state after. Denies CP, palpitations, N/V, dizziness, SOB, LE edema, vision changes. Reports he follows with cardiology in [place], recent stress test and echo few months ago wnl. Denies symptoms currently.

## 2024-05-20 NOTE — ED PROVIDER NOTE - PHYSICAL EXAMINATION
General: NAD, well appearing  HEENT: Normocephalic, atraumatic  Neck: No apparent stiffness or JVD  Pulm: Chest wall symmetric and nontender, lungs clear to ascultation   Cardiac: Regular rate and regular rhythm  Abdomen: Nontender and nondistended  Skin: Skin is warm, dry and intact without rashes or lesions.  Neuro: No motor or sensory deficits, CN 2-12 intact, PERRLA, EOMI, moving extremities equally  MSK: No deformity or tenderness  Psych: calm, cooperative

## 2024-05-20 NOTE — ED PROVIDER NOTE - CLINICAL SUMMARY MEDICAL DECISION MAKING FREE TEXT BOX
69M with PMHx of HTN, T2DM, ?thrombosis of vein in abdomen, on eliquis, prostate cancer on radiation (day 10 of 45 today) presents s/p witnessed syncopal episode    VSS. EKG NSR. Will r/o ACS, electrolytes abnormalities. CBC, CMP, Mag, Phos, Trop ordered. 69M with PMHx of HTN, T2DM, ?thrombosis of vein in abdomen, on eliquis, prostate cancer on radiation (day 10 of 45 today) presents s/p witnessed syncopal episode    VSS. EKG NSR. Will r/o ACS, electrolytes abnormalities, PE. CBC, CMP, Mag, Phos, Trop, D-dimer ordered. Assess for orthostatic hypotension.

## 2024-05-21 VITALS
HEART RATE: 64 BPM | RESPIRATION RATE: 18 BRPM | OXYGEN SATURATION: 100 % | SYSTOLIC BLOOD PRESSURE: 139 MMHG | DIASTOLIC BLOOD PRESSURE: 84 MMHG

## 2024-05-21 PROBLEM — Z78.9 OTHER SPECIFIED HEALTH STATUS: Chronic | Status: ACTIVE | Noted: 2021-10-19

## 2024-05-21 LAB
ANION GAP SERPL CALC-SCNC: 11 MMOL/L — SIGNIFICANT CHANGE UP (ref 5–17)
BASOPHILS # BLD AUTO: 0 K/UL — SIGNIFICANT CHANGE UP (ref 0–0.2)
BASOPHILS NFR BLD AUTO: 0 % — SIGNIFICANT CHANGE UP (ref 0–2)
BUN SERPL-MCNC: 16.3 MG/DL — SIGNIFICANT CHANGE UP (ref 8–20)
CALCIUM SERPL-MCNC: 9.1 MG/DL — SIGNIFICANT CHANGE UP (ref 8.4–10.5)
CHLORIDE SERPL-SCNC: 98 MMOL/L — SIGNIFICANT CHANGE UP (ref 96–108)
CO2 SERPL-SCNC: 26 MMOL/L — SIGNIFICANT CHANGE UP (ref 22–29)
CREAT SERPL-MCNC: 0.9 MG/DL — SIGNIFICANT CHANGE UP (ref 0.5–1.3)
EGFR: 94 ML/MIN/1.73M2 — SIGNIFICANT CHANGE UP
EOSINOPHIL # BLD AUTO: 0.09 K/UL — SIGNIFICANT CHANGE UP (ref 0–0.5)
EOSINOPHIL NFR BLD AUTO: 2.6 % — SIGNIFICANT CHANGE UP (ref 0–6)
GLUCOSE BLDC GLUCOMTR-MCNC: 171 MG/DL — HIGH (ref 70–99)
GLUCOSE SERPL-MCNC: 228 MG/DL — HIGH (ref 70–99)
HCT VFR BLD CALC: 34.9 % — LOW (ref 39–50)
HGB BLD-MCNC: 12.1 G/DL — LOW (ref 13–17)
LYMPHOCYTES # BLD AUTO: 0.74 K/UL — LOW (ref 1–3.3)
LYMPHOCYTES # BLD AUTO: 21.9 % — SIGNIFICANT CHANGE UP (ref 13–44)
MAGNESIUM SERPL-MCNC: 2 MG/DL — SIGNIFICANT CHANGE UP (ref 1.6–2.6)
MANUAL SMEAR VERIFICATION: SIGNIFICANT CHANGE UP
MCHC RBC-ENTMCNC: 29.9 PG — SIGNIFICANT CHANGE UP (ref 27–34)
MCHC RBC-ENTMCNC: 34.7 GM/DL — SIGNIFICANT CHANGE UP (ref 32–36)
MCV RBC AUTO: 86.2 FL — SIGNIFICANT CHANGE UP (ref 80–100)
MONOCYTES # BLD AUTO: 0.12 K/UL — SIGNIFICANT CHANGE UP (ref 0–0.9)
MONOCYTES NFR BLD AUTO: 3.5 % — SIGNIFICANT CHANGE UP (ref 2–14)
NEUTROPHILS # BLD AUTO: 2.4 K/UL — SIGNIFICANT CHANGE UP (ref 1.8–7.4)
NEUTROPHILS NFR BLD AUTO: 71.1 % — SIGNIFICANT CHANGE UP (ref 43–77)
PLAT MORPH BLD: NORMAL — SIGNIFICANT CHANGE UP
PLATELET # BLD AUTO: 143 K/UL — LOW (ref 150–400)
POTASSIUM SERPL-MCNC: 4.3 MMOL/L — SIGNIFICANT CHANGE UP (ref 3.5–5.3)
POTASSIUM SERPL-SCNC: 4.3 MMOL/L — SIGNIFICANT CHANGE UP (ref 3.5–5.3)
RBC # BLD: 4.05 M/UL — LOW (ref 4.2–5.8)
RBC # FLD: 12.4 % — SIGNIFICANT CHANGE UP (ref 10.3–14.5)
RBC BLD AUTO: NORMAL — SIGNIFICANT CHANGE UP
SODIUM SERPL-SCNC: 135 MMOL/L — SIGNIFICANT CHANGE UP (ref 135–145)
TROPONIN T, HIGH SENSITIVITY RESULT: 36 NG/L — SIGNIFICANT CHANGE UP (ref 0–51)
VARIANT LYMPHS # BLD: 0.9 % — SIGNIFICANT CHANGE UP (ref 0–6)
WBC # BLD: 3.38 K/UL — LOW (ref 3.8–10.5)
WBC # FLD AUTO: 3.38 K/UL — LOW (ref 3.8–10.5)

## 2024-05-21 PROCEDURE — 80053 COMPREHEN METABOLIC PANEL: CPT

## 2024-05-21 PROCEDURE — 82962 GLUCOSE BLOOD TEST: CPT

## 2024-05-21 PROCEDURE — 93306 TTE W/DOPPLER COMPLETE: CPT

## 2024-05-21 PROCEDURE — 93306 TTE W/DOPPLER COMPLETE: CPT | Mod: 26

## 2024-05-21 PROCEDURE — 80048 BASIC METABOLIC PNL TOTAL CA: CPT

## 2024-05-21 PROCEDURE — 99236 HOSP IP/OBS SAME DATE HI 85: CPT

## 2024-05-21 PROCEDURE — G0378: CPT

## 2024-05-21 PROCEDURE — 84484 ASSAY OF TROPONIN QUANT: CPT

## 2024-05-21 PROCEDURE — 99285 EMERGENCY DEPT VISIT HI MDM: CPT | Mod: 25

## 2024-05-21 PROCEDURE — 93005 ELECTROCARDIOGRAM TRACING: CPT

## 2024-05-21 PROCEDURE — 36415 COLL VENOUS BLD VENIPUNCTURE: CPT

## 2024-05-21 PROCEDURE — 84443 ASSAY THYROID STIM HORMONE: CPT

## 2024-05-21 PROCEDURE — 83735 ASSAY OF MAGNESIUM: CPT

## 2024-05-21 PROCEDURE — 96374 THER/PROPH/DIAG INJ IV PUSH: CPT

## 2024-05-21 PROCEDURE — 71045 X-RAY EXAM CHEST 1 VIEW: CPT

## 2024-05-21 PROCEDURE — 85025 COMPLETE CBC W/AUTO DIFF WBC: CPT

## 2024-05-21 PROCEDURE — 84100 ASSAY OF PHOSPHORUS: CPT

## 2024-05-21 PROCEDURE — 85379 FIBRIN DEGRADATION QUANT: CPT

## 2024-05-21 RX ORDER — SODIUM CHLORIDE 9 MG/ML
1000 INJECTION, SOLUTION INTRAVENOUS
Refills: 0 | Status: DISCONTINUED | OUTPATIENT
Start: 2024-05-21 | End: 2024-05-28

## 2024-05-21 RX ORDER — APIXABAN 2.5 MG/1
1 TABLET, FILM COATED ORAL
Refills: 0 | DISCHARGE

## 2024-05-21 RX ORDER — LOSARTAN POTASSIUM 100 MG/1
100 TABLET, FILM COATED ORAL DAILY
Refills: 0 | Status: DISCONTINUED | OUTPATIENT
Start: 2024-05-21 | End: 2024-05-28

## 2024-05-21 RX ORDER — SITAGLIPTIN AND METFORMIN HYDROCHLORIDE 500; 50 MG/1; MG/1
1 TABLET, FILM COATED ORAL
Refills: 0 | DISCHARGE

## 2024-05-21 RX ORDER — DEXTROSE 50 % IN WATER 50 %
12.5 SYRINGE (ML) INTRAVENOUS ONCE
Refills: 0 | Status: DISCONTINUED | OUTPATIENT
Start: 2024-05-21 | End: 2024-05-28

## 2024-05-21 RX ORDER — DEXTROSE 10 % IN WATER 10 %
125 INTRAVENOUS SOLUTION INTRAVENOUS ONCE
Refills: 0 | Status: DISCONTINUED | OUTPATIENT
Start: 2024-05-21 | End: 2024-05-28

## 2024-05-21 RX ORDER — INSULIN LISPRO 100/ML
VIAL (ML) SUBCUTANEOUS
Refills: 0 | Status: DISCONTINUED | OUTPATIENT
Start: 2024-05-21 | End: 2024-05-28

## 2024-05-21 RX ORDER — ATORVASTATIN CALCIUM 80 MG/1
10 TABLET, FILM COATED ORAL AT BEDTIME
Refills: 0 | Status: DISCONTINUED | OUTPATIENT
Start: 2024-05-21 | End: 2024-05-28

## 2024-05-21 RX ORDER — DEXTROSE 50 % IN WATER 50 %
25 SYRINGE (ML) INTRAVENOUS ONCE
Refills: 0 | Status: DISCONTINUED | OUTPATIENT
Start: 2024-05-21 | End: 2024-05-28

## 2024-05-21 RX ORDER — DAPAGLIFLOZIN 10 MG/1
1 TABLET, FILM COATED ORAL
Refills: 0 | DISCHARGE

## 2024-05-21 RX ORDER — DIAZEPAM 5 MG
1 TABLET ORAL
Refills: 0 | DISCHARGE

## 2024-05-21 RX ORDER — GLUCAGON INJECTION, SOLUTION 0.5 MG/.1ML
1 INJECTION, SOLUTION SUBCUTANEOUS ONCE
Refills: 0 | Status: DISCONTINUED | OUTPATIENT
Start: 2024-05-21 | End: 2024-05-28

## 2024-05-21 RX ORDER — DEXTROSE 50 % IN WATER 50 %
15 SYRINGE (ML) INTRAVENOUS ONCE
Refills: 0 | Status: DISCONTINUED | OUTPATIENT
Start: 2024-05-21 | End: 2024-05-28

## 2024-05-21 RX ORDER — APIXABAN 2.5 MG/1
5 TABLET, FILM COATED ORAL
Refills: 0 | Status: DISCONTINUED | OUTPATIENT
Start: 2024-05-21 | End: 2024-05-28

## 2024-05-21 RX ORDER — ATORVASTATIN CALCIUM 80 MG/1
1 TABLET, FILM COATED ORAL
Refills: 0 | DISCHARGE

## 2024-05-21 RX ORDER — OLMESARTAN MEDOXOMIL-HYDROCHLOROTHIAZIDE 25; 40 MG/1; MG/1
1 TABLET, FILM COATED ORAL
Refills: 0 | DISCHARGE

## 2024-05-21 RX ADMIN — LOSARTAN POTASSIUM 100 MILLIGRAM(S): 100 TABLET, FILM COATED ORAL at 06:06

## 2024-05-21 RX ADMIN — APIXABAN 5 MILLIGRAM(S): 2.5 TABLET, FILM COATED ORAL at 08:25

## 2024-05-21 RX ADMIN — Medication 1: at 08:24

## 2024-05-21 NOTE — ED CDU PROVIDER INITIAL DAY NOTE - ATTENDING APP SHARED VISIT CONTRIBUTION OF CARE
I, Rossy Neal, personally saw the patient with the PA, and completed the key components of the history and physical exam. I then discussed the management plan with the PA.

## 2024-05-21 NOTE — ED CDU PROVIDER DISPOSITION NOTE - CLINICAL COURSE
70yo M with PMHx of HTN, T2DM, ?thrombosis of vein in abdomen, on eliquis, prostate cancer on radiation presents s/p witnessed syncopal episode, precipitating diaphoresis, denies cp/palpitations.  Trop 41->37->36, CXR clear, D-Dimer negative. Hypomagnesemia 1.5 on initial labs, repleted, improved to 2.0 on rpt labs. Pt placed on observation for telemetry monitoring, echo. Pt stable throughout obs stay. TTE grossly unremarkable, EF 65-70%, no wall motion abnormalities. all results d/w pt and family. provided copy of all results. advised to f/u with cardiologist. return precautions discussed.

## 2024-05-21 NOTE — ED ADULT NURSE REASSESSMENT NOTE - NS ED NURSE REASSESS COMMENT FT1
Patient remains A&Ox4, resting on stretcher in no acute distress.  Pt remains under observation services pending echo after syncopal episode at home.  pt is NSR on monitor with no events reported.  Safety maintained with bed locked in lowest position, spouse present at bedside.
Pt taken to Echo.
Assumed care of pt from JAIME Maya.    Pt laying in bed, resting. Pt A&Ox4 in NAD @ this time. RR even & unlabored. Pt sinus sammy on cardiac monitor. Pt denies any pain, complaints, or needs @ this time. Pt in OBS and awaiting Echo. Safety maintained.

## 2024-05-21 NOTE — ED CDU PROVIDER DISPOSITION NOTE - NSFOLLOWUPINSTRUCTIONS_ED_ALL_ED_FT
- Return to the ED for any new or worsening symptoms.   - Follow-up with your cardiologist    Syncope    Syncope is when you temporarily lose consciousness, also called fainting or passing out. It is caused by a sudden decrease in blood flow to the brain. Even though most causes of syncope are not dangerous, syncope can possibly be a sign of a serious medical problem. Signs that you may be about to faint include feeling dizzy, lightheaded, nausea, visual changes, or cold/clammy skin. Do not drive, operate heavy machinery, or play sports until your health care provider says it is okay.    SEEK IMMEDIATE MEDICAL CARE IF YOU HAVE ANY OF THE FOLLOWING SYMPTOMS: severe headache, pain in your chest/abdomen/back, bleeding from your mouth or rectum, palpitations, shortness of breath, pain with breathing, seizure, confusion, or trouble walking.

## 2024-05-21 NOTE — ED CDU PROVIDER DISPOSITION NOTE - ATTENDING CONTRIBUTION TO CARE
I, Hoang Blanton MD, have seen and examined the patient on the date of service.  I agree with the TONY's assessment as written, with exceptions or additions as noted below or in a separate note.  Patient with a past medical history of hypertension, diabetes presented with witnessed syncopal episode from home.  Patient had downtrending troponins.  Initially was hypomagnesemic.  Repleted here.  Placed in observation for telemetry and echo.  Workup here unremarkable.  Patient feeling well, plan for discharge.

## 2024-05-21 NOTE — ED CDU PROVIDER INITIAL DAY NOTE - OBJECTIVE STATEMENT
69M with PMHx of HTN, T2DM, ?thrombosis of vein in abdomen, on eliquis, prostate cancer on radiation (day 10 of 45 today) presents s/p witnessed syncopal episode around 1800 while sitting at the dinner table that lasted about 2 minutes according to wife. Patient was diaphoretic and then lost consciousness. Wife reports she stabilized him during episode, denies head strike. Patient regained consciousness after a couple minutes, denies confused state after. Denies CP, palpitations, N/V, dizziness, SOB, LE edema, vision changes. Reports he follows with cardiology in [place], recent stress test and echo few months ago wnl. Denies symptoms currently.

## 2024-05-21 NOTE — ED CDU PROVIDER DISPOSITION NOTE - PATIENT PORTAL LINK FT
You can access the FollowMyHealth Patient Portal offered by [facility] by registering at the following website: [email or web address]. By joining [facility]’s FollowMyHealth portal, you will also be able to view your health information using other applications (apps) compatible with our system.

## 2024-05-21 NOTE — ED CDU PROVIDER INITIAL DAY NOTE - CLINICAL SUMMARY MEDICAL DECISION MAKING FREE TEXT BOX
69M with PMHx of HTN, T2DM, ?thrombosis of vein in abdomen, on eliquis, prostate cancer on radiation (day 10 of 45 today) presents s/p witnessed syncopal episode, precipitating diaphoresis, denies cp/palpitations.  Trop 41->37, CXR clear, D-Dimer negative. Magnesium low, repleted  Pt placed on observation for telemetry monitoring, echo , will trend cbc and rpt labs

## 2025-01-20 ENCOUNTER — OFFICE (OUTPATIENT)
Dept: URBAN - METROPOLITAN AREA CLINIC 104 | Facility: CLINIC | Age: 70
Setting detail: OPHTHALMOLOGY
End: 2025-01-20
Payer: MEDICARE

## 2025-01-20 VITALS — WEIGHT: 175 LBS | BODY MASS INDEX: 27.47 KG/M2 | HEIGHT: 67 IN

## 2025-01-20 DIAGNOSIS — E11.9: ICD-10-CM

## 2025-01-20 DIAGNOSIS — H25.13: ICD-10-CM

## 2025-01-20 DIAGNOSIS — H52.4: ICD-10-CM

## 2025-01-20 PROCEDURE — 92015 DETERMINE REFRACTIVE STATE: CPT | Performed by: OPHTHALMOLOGY

## 2025-01-20 PROCEDURE — 92004 COMPRE OPH EXAM NEW PT 1/>: CPT | Performed by: OPHTHALMOLOGY

## 2025-01-20 ASSESSMENT — PACHYMETRY
OD_CT_UM: 562
OD_CT_CORRECTION: -1
OS_CT_UM: 566
OS_CT_CORRECTION: -1

## 2025-01-20 ASSESSMENT — REFRACTION_MANIFEST
OD_AXIS: 095
OS_SPHERE: +0.50
OS_ADD: +2.50
OS_VA1: 20/25-1
OD_ADD: +2.50
OS_AXIS: 044
OD_SPHERE: +1.25
OS_CYLINDER: -1.00
OD_VA1: 20/25-2
OD_CYLINDER: -1.50

## 2025-01-20 ASSESSMENT — REFRACTION_CURRENTRX
OS_ADD: +2.50
OD_SPHERE: -1.00
OS_CYLINDER: -0.25
OD_ADD: +2.50
OD_AXIS: 095
OS_OVR_VA: 20/
OD_CYLINDER: -0.50
OD_OVR_VA: 20/
OS_SPHERE: -1.50
OS_AXIS: 148

## 2025-01-20 ASSESSMENT — TONOMETRY
OD_IOP_MMHG: 12
OS_IOP_MMHG: 13

## 2025-01-20 ASSESSMENT — KERATOMETRY
OD_AXISANGLE_DEGREES: 012
OD_K2POWER_DIOPTERS: 43.10
OD_K1POWER_DIOPTERS: 42.61
OS_K2POWER_DIOPTERS: 43.32
OS_AXISANGLE_DEGREES: 012
OS_K1POWER_DIOPTERS: 42.56

## 2025-01-20 ASSESSMENT — REFRACTION_AUTOREFRACTION
OS_SPHERE: +0.25
OS_AXIS: 044
OD_CYLINDER: -1.50
OD_AXIS: 095
OS_CYLINDER: -1.25
OD_SPHERE: +1.00

## 2025-01-20 ASSESSMENT — CONFRONTATIONAL VISUAL FIELD TEST (CVF)
OS_FINDINGS: FULL
OD_FINDINGS: FULL

## 2025-01-20 ASSESSMENT — VISUAL ACUITY
OS_BCVA: 20/40-1
OD_BCVA: 20/50